# Patient Record
Sex: FEMALE | URBAN - METROPOLITAN AREA
[De-identification: names, ages, dates, MRNs, and addresses within clinical notes are randomized per-mention and may not be internally consistent; named-entity substitution may affect disease eponyms.]

---

## 2022-07-19 ENCOUNTER — MEDICAL CORRESPONDENCE (OUTPATIENT)
Dept: HEALTH INFORMATION MANAGEMENT | Facility: CLINIC | Age: 57
End: 2022-07-19

## 2022-07-19 ENCOUNTER — TRANSFERRED RECORDS (OUTPATIENT)
Dept: HEALTH INFORMATION MANAGEMENT | Facility: CLINIC | Age: 57
End: 2022-07-19

## 2022-07-19 LAB — TSH SERPL-ACNC: 0.01 UIU/ML (ref 0.45–4.5)

## 2022-08-26 ENCOUNTER — TRANSFERRED RECORDS (OUTPATIENT)
Dept: HEALTH INFORMATION MANAGEMENT | Facility: CLINIC | Age: 57
End: 2022-08-26

## 2022-08-30 ENCOUNTER — MEDICAL CORRESPONDENCE (OUTPATIENT)
Dept: HEALTH INFORMATION MANAGEMENT | Facility: CLINIC | Age: 57
End: 2022-08-30

## 2022-09-14 ENCOUNTER — TRANSFERRED RECORDS (OUTPATIENT)
Dept: HEALTH INFORMATION MANAGEMENT | Facility: CLINIC | Age: 57
End: 2022-09-14

## 2022-09-14 ENCOUNTER — MEDICAL CORRESPONDENCE (OUTPATIENT)
Dept: HEALTH INFORMATION MANAGEMENT | Facility: CLINIC | Age: 57
End: 2022-09-14

## 2022-09-15 ENCOUNTER — TELEPHONE (OUTPATIENT)
Dept: PULMONOLOGY | Facility: CLINIC | Age: 57
End: 2022-09-15

## 2022-09-15 NOTE — TELEPHONE ENCOUNTER
Contacted [facility] [place] @ P: [phone]. Requested CT chest and CXR from 08/26/2022 be pushed to  PACS for review. Imaging reports have been sent to scanning.    Janice Spencer LPN  Pulmonary Medicine:  [facility]  Phone: [phone] Fax: [phone]

## 2022-09-16 ENCOUNTER — TRANSCRIBE ORDERS (OUTPATIENT)
Dept: OTHER | Age: 57
End: 2022-09-16

## 2022-09-16 DIAGNOSIS — R93.89 ABNORMAL FINDINGS ON DIAGNOSTIC IMAGING OF OTHER SPECIFIED BODY STRUCTURES: Primary | ICD-10-CM

## 2022-09-21 ENCOUNTER — MEDICAL CORRESPONDENCE (OUTPATIENT)
Dept: HEALTH INFORMATION MANAGEMENT | Facility: CLINIC | Age: 57
End: 2022-09-21

## 2022-09-27 ENCOUNTER — TRANSCRIBE ORDERS (OUTPATIENT)
Dept: OTHER | Age: 57
End: 2022-09-27

## 2022-09-27 DIAGNOSIS — R93.89 ABNORMAL FINDINGS ON DIAGNOSTIC IMAGING OF OTHER SPECIFIED BODY STRUCTURES: Primary | ICD-10-CM

## 2022-09-30 DIAGNOSIS — R05.9 COUGH: ICD-10-CM

## 2022-09-30 DIAGNOSIS — R91.8 PULMONARY NODULES: ICD-10-CM

## 2022-09-30 DIAGNOSIS — R06.00 DYSPNEA: ICD-10-CM

## 2022-09-30 DIAGNOSIS — R91.8 OPACITY OF LUNG ON IMAGING STUDY: Primary | ICD-10-CM

## 2022-10-07 ENCOUNTER — TELEPHONE (OUTPATIENT)
Dept: PULMONOLOGY | Facility: CLINIC | Age: 57
End: 2022-10-07

## 2022-10-10 NOTE — TELEPHONE ENCOUNTER
Patient was not contacted by [place] [facility]. Dr. Baig does not have any sooner availability for patient to be seen. Patient may contact [place] ([phone]) or [place] [facility] ([phone]) if wanting to be seen sooner.      Janice Spencer LPN  Pulmonary Medicine:  [facility] [place] [place]  Phone: [phone] Fax: [phone]

## 2022-11-14 ENCOUNTER — OFFICE VISIT (OUTPATIENT)
Dept: NURSING | Facility: CLINIC | Age: 57
End: 2022-11-14
Payer: COMMERCIAL

## 2022-11-14 ENCOUNTER — OFFICE VISIT (OUTPATIENT)
Dept: PULMONOLOGY | Facility: CLINIC | Age: 57
End: 2022-11-14
Payer: COMMERCIAL

## 2022-11-14 VITALS
WEIGHT: 147 LBS | SYSTOLIC BLOOD PRESSURE: 130 MMHG | RESPIRATION RATE: 14 BRPM | OXYGEN SATURATION: 98 % | DIASTOLIC BLOOD PRESSURE: 76 MMHG | HEART RATE: 64 BPM

## 2022-11-14 VITALS — HEART RATE: 61 BPM | OXYGEN SATURATION: 99 % | WEIGHT: 147.9 LBS

## 2022-11-14 DIAGNOSIS — R05.9 COUGH: Primary | ICD-10-CM

## 2022-11-14 DIAGNOSIS — R93.89 ABNORMAL FINDINGS ON DIAGNOSTIC IMAGING OF OTHER SPECIFIED BODY STRUCTURES: ICD-10-CM

## 2022-11-14 LAB
DLCOUNC-%PRED-PRE: 84 %
DLCOUNC-PRE: 17.69 ML/MIN/MMHG
DLCOUNC-PRED: 20.82 ML/MIN/MMHG
ERV-%PRED-PRE: 141 %
ERV-PRE: 1.24 L
ERV-PRED: 0.88 L
EXPTIME-PRE: 6.31 SEC
FEF2575-%PRED-PRE: 145 %
FEF2575-PRE: 3.36 L/SEC
FEF2575-PRED: 2.31 L/SEC
FEFMAX-%PRED-PRE: 98 %
FEFMAX-PRE: 6.42 L/SEC
FEFMAX-PRED: 6.52 L/SEC
FEV1-%PRED-PRE: 121 %
FEV1-PRE: 2.97 L
FEV1FEV6-PRE: 84 %
FEV1FEV6-PRED: 81 %
FEV1FVC-PRE: 84 %
FEV1FVC-PRED: 80 %
FEV1SVC-PRE: 84 %
FEV1SVC-PRED: 72 %
FIFMAX-PRE: 3.52 L/SEC
FRCPLETH-%PRED-PRE: 96 %
FRCPLETH-PRE: 2.62 L
FRCPLETH-PRED: 2.73 L
FVC-%PRED-PRE: 116 %
FVC-PRE: 3.54 L
FVC-PRED: 3.05 L
IC-%PRED-PRE: 90 %
IC-PRE: 2.29 L
IC-PRED: 2.52 L
RVPLETH-%PRED-PRE: 73 %
RVPLETH-PRE: 1.39 L
RVPLETH-PRED: 1.88 L
TLCPLETH-%PRED-PRE: 97 %
TLCPLETH-PRE: 4.91 L
TLCPLETH-PRED: 5.02 L
VA-%PRED-PRE: 97 %
VA-PRE: 4.81 L
VC-%PRED-PRE: 103 %
VC-PRE: 3.53 L
VC-PRED: 3.4 L

## 2022-11-14 PROCEDURE — 99205 OFFICE O/P NEW HI 60 MIN: CPT | Mod: 25 | Performed by: INTERNAL MEDICINE

## 2022-11-14 PROCEDURE — 94375 RESPIRATORY FLOW VOLUME LOOP: CPT | Performed by: INTERNAL MEDICINE

## 2022-11-14 PROCEDURE — 94729 DIFFUSING CAPACITY: CPT | Performed by: INTERNAL MEDICINE

## 2022-11-14 PROCEDURE — 94726 PLETHYSMOGRAPHY LUNG VOLUMES: CPT | Performed by: INTERNAL MEDICINE

## 2022-11-14 RX ORDER — FLUTICASONE PROPIONATE AND SALMETEROL XINAFOATE 230; 21 UG/1; UG/1
2 AEROSOL, METERED RESPIRATORY (INHALATION) 2 TIMES DAILY
Qty: 12 G | Refills: 3 | Status: SHIPPED | OUTPATIENT
Start: 2022-11-14 | End: 2022-12-14

## 2022-11-14 RX ORDER — LEVOTHYROXINE, LIOTHYRONINE 76; 18 UG/1; UG/1
120 TABLET ORAL DAILY
COMMUNITY
Start: 2022-11-11

## 2022-11-14 ASSESSMENT — PAIN SCALES - GENERAL: PAINLEVEL: NO PAIN (0)

## 2022-11-14 NOTE — PROGRESS NOTES
"Pulmonary Clinic New Patient Consult  Reason for Consult: Chronic Cough  History of Present Illness  I had the pleasure of seeing Milagros Jiang, who is a 57 yr old female who presents with persistent cough and chest heaviness.  To briefly review,  She contracted COVID 19 with mild symptoms and had symptoms of productive cough, fevers and malaise. She was treated with antibiotics as well as albuterol inhalers at that time with some relief. She was seen at the urgent care/ER twice but no chest imaging was obtained.  She continues with persistent cough and chest heaviness.even after resolution of her other symptoms. Cough is dry and worse during the day. She denies any significant GERD, rhinitis nor nasal congestion. No dysphonia, dysphagia, no SOB, fevers or chest pain. She describes associated chest heaviness but denies any wheezing. She was treated with another round of antibiotics which was not helpful.  No hx of childhood asthma. No new rash or muscle/joint pains/stiffness.  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     Review of Systems:  10 of 14 systems reviewed and are negative unless otherwise stated in HPI.    No past medical history on file.    No past surgical history on file.    No family history on file.    Social History     Socioeconomic History     Marital status:      Spouse name: None     Number of children: None     Years of education: None     Highest education level: None   Tobacco Use     Smoking status: Never     Smokeless tobacco: Never         Allergies   Allergen Reactions     Codeine Other (See Comments)     \"it feels like I have bugs\"  Feels like bugs are crawling on her           Current Outpatient Medications:      fluticasone-salmeterol (ADVAIR-HFA) 230-21 MCG/ACT inhaler, Inhale 2 puffs into the lungs 2 times daily for 30 days, Disp: 12 g, Rfl: 3     NP THYROID 120 MG tablet, Take 120 mg by mouth daily, Disp: , Rfl:       Physical Exam:  /76   Pulse 64   Resp 14  "  Wt 66.7 kg (147 lb)   SpO2 98%   GENERAL: Well developed, well nourished, alert, and in no apparent distress.  HEENT: Normocephalic, atraumatic. PERRL, EOMI. Oral mucosa is moist. No perioral cyanosis.  NECK: supple, no masses, no thyromegaly.  RESP:  Normal respiratory effort.  CTAB.  No rales, wheezes, rhonchi.  No cyanosis or clubbing.  CV: Normal S1, S2, regular rhythm, normal rate. No murmur.  No LE edema.   ABDOMEN:  Soft, non-tender, non-distended.   SKIN: warm and dry. No rash.  NEURO: AAOx3.  Normal gait.  Fluent speech.  PSYCH: mentation appears normal.     Results:  PFTs: Reviewed and reviewed with patient- normal spirometry  Most Recent [facility] Pulmonary Function Testing    No results found for: 20001  No results found for: 20002  No results found for: 20003  No results found for: 20015  No results found for: 20016  No results found for: 20027  No results found for: 20028  No results found for: 20029  No results found for: 20079  No results found for: 20080  No results found for: 20081  No results found for: 20335  No results found for: 20105  No results found for: 20053  No results found for: 20054  No results found for: 20055    Most Recent [facility] Pulmonary Function Testing  Discussed and reviewed with patient- Normal spirometry  Imaging (personally reviewed in clinic today): CT Chest 8/28/2022  Discussed and reviewed with patient        Assessment and Plan:   Chronic Cough/Abnormal CT Chest  Reviewed PFTs which are unremarkable. Symptoms have remained persistent since contacting COVID 19 infection about 1 year ago. CT Chest does show subtle upper lobe predominant fibrotic changes and scattered areas of ground glass and consolidative opacities. These findings may be sequelae of previous COVID 19 infection. She has no other systemic symptoms such as fevers, night sweats nor SOB.  She does have associated chest tightness/heaviness and I will try her on Advair to see if she derives any benefit.  If her  symptoms are persistent and not responsive to inhalers, I will try her on an empiric course of prednisone. The findings are not very typical for organizing pneumonia but is certainly a consideration. She has tried antibiotics which were not helpful.  Lung Nodules  Will repeat CT chest in 6 months    Questions and concerns were answered to the patient's satisfaction.  she was provided with my contact information should new questions or concerns arise in the interim.  She should return to clinic in 6 months  I spent a total of 60 minutes face to face with Milagros Jiang during today's office visit. Over 50% of this time was spent counseling the patient and/or coordinating care regarding their pulmonary disease.    Up to date on vaccination  Ebenezer Baig MD  Pulmonary, Critical Care and Sleep Medicine  [email or web address]  Pager: [phone]        The above note was dictated using voice recognition software and may include typographical errors. Please contact the author for any clarifications.

## 2022-11-14 NOTE — PROGRESS NOTES
Milagros Jiang's goals for this visit include: Consult  She requests these members of her care team be copied on today's visit information: PCP    PCP: No Ref-Primary, Physician    Referring Provider:  Clara Morejon PA-C  [address]  [place] [address]    /76   Pulse 64   Resp 14   Wt 66.7 kg (147 lb)   SpO2 98%     Do you need any medication refills at today's visit? N      Janice Spencer LPN  Pulmonary Medicine:  [facility]  Phone: [phone] Fax: [phone]

## 2022-12-12 NOTE — RESULT ENCOUNTER NOTE
Results discussed directly with patient while patient was present. Any further details documented in the note.   Ebenezer Baig MD

## 2023-02-12 ENCOUNTER — HEALTH MAINTENANCE LETTER (OUTPATIENT)
Age: 58
End: 2023-02-12

## 2023-06-19 ENCOUNTER — TELEPHONE (OUTPATIENT)
Dept: PULMONOLOGY | Facility: CLINIC | Age: 58
End: 2023-06-19
Payer: COMMERCIAL

## 2023-06-19 NOTE — TELEPHONE ENCOUNTER
CT chest orders have been faxed to [facility] per pt request. Unable to reach patient via telephone. Unable to leave a voicemail, as patient's VM box is full.  message sent to the pt informing her that CT chest orders were faxed to [facility].    Janice Spencer LPN  Pulmonary Medicine:  [facility]  Phone: [phone] Fax: [phone]

## 2023-06-19 NOTE — TELEPHONE ENCOUNTER
[facility] Call Center    Phone Message    May a detailed message be left on voicemail: yes     Reason for Call:  Pt is calling to request referral to [facility], Fax: [phone] Ct scan. Please call pt to confirm      Action Taken: Message routed to:  [facility] PULM     Travel Screening: Not Applicable

## 2023-06-22 ENCOUNTER — TRANSFERRED RECORDS (OUTPATIENT)
Dept: HEALTH INFORMATION MANAGEMENT | Facility: CLINIC | Age: 58
End: 2023-06-22

## 2023-08-07 ENCOUNTER — TELEPHONE (OUTPATIENT)
Dept: PULMONOLOGY | Facility: CLINIC | Age: 58
End: 2023-08-07
Payer: COMMERCIAL

## 2023-08-07 NOTE — TELEPHONE ENCOUNTER
Spoke with Milagros. She was under the impression that her CT results from [facility] would be at our clinic. I told her I was unsure of where the results are but that I couldn't find them in her chart. I also informed her that she needed to make a follow-up appointment with Dr. Baig. Once the appointment is made we will call [facility] for her results so that Dr. Baig can discuss those with her. Patient agreeable to POC.  I transferred the call to the scheduling line.    Milagros Plaza RN  11:31 AM

## 2023-08-07 NOTE — TELEPHONE ENCOUNTER
[facility] Call Center    Phone Message    May a detailed message be left on voicemail: yes     Reason for Call: Other: Per pt has not receive a call bacout her CT Chest results. Per pt knows it was done at [facility] but thought the results would be sent back to the team. Please call pt back to discuss. Thank you!     Action Taken: Message routed to:  [facility] pulm    Travel Screening: Not Applicable

## 2023-08-17 NOTE — TELEPHONE ENCOUNTER
Contacted [facility] medical records in [place] @P: [phone]. Requested CT chest from June 2023 as well as report be sent to  for review.    Janice Spencer LPN  Pulmonary Medicine:  [facility]  Phone: [phone] Fax: [phone]

## 2023-08-22 NOTE — TELEPHONE ENCOUNTER
CT chest from 06/22/2023 is available for review in PACS and report has been received.    Janice Spencer LPN  Pulmonary Medicine:  [facility]  Phone: [phone] Fax: [phone]

## 2023-12-31 ENCOUNTER — HEALTH MAINTENANCE LETTER (OUTPATIENT)
Age: 58
End: 2023-12-31

## 2024-03-10 ENCOUNTER — HEALTH MAINTENANCE LETTER (OUTPATIENT)
Age: 59
End: 2024-03-10

## 2025-03-16 ENCOUNTER — HEALTH MAINTENANCE LETTER (OUTPATIENT)
Age: 60
End: 2025-03-16